# Patient Record
Sex: MALE | Race: WHITE | NOT HISPANIC OR LATINO | URBAN - METROPOLITAN AREA
[De-identification: names, ages, dates, MRNs, and addresses within clinical notes are randomized per-mention and may not be internally consistent; named-entity substitution may affect disease eponyms.]

---

## 2020-03-13 NOTE — PATIENT DISCUSSION
"""H/o OHT OU. IOP 25 OU. OCT(RNFL) done today within normal quadrants.  Schedule [facility] 24-""

## 2020-12-23 NOTE — PATIENT DISCUSSION
"""S/P IOL OS: Sensar AAB00 23 +Omidria. Continue post operative instructions and drops per schedule.  """

## 2020-12-30 NOTE — PATIENT DISCUSSION
"""IOP 32/29 today in the office. Stop Travatan Z left eye at bedtime.  Start Cosopt PF both eyes ""

## 2021-01-06 NOTE — PATIENT DISCUSSION
"""S/P IOL OD: Sensar AAB00 22 +Omidria. Continue post operative instructions and drops per schedule.  """

## 2021-01-15 NOTE — PATIENT DISCUSSION
"""S/P IOL OD: Sensar AAB00 22 +Omidria.  Continue post operative instructions and drops per schedule. "" ""Continue Cosopt PF both eyes twice a day

## 2021-02-18 NOTE — PATIENT DISCUSSION
"""OD improving. Discussed today's findings with patient.  Will slowly taper patient off of steroid to ""

## 2021-03-25 NOTE — PATIENT DISCUSSION
"""Educated patient on today's findings. Resolved iritis s/p CE OD.  Patient denies light sensitivity or ""

## 2021-06-29 ENCOUNTER — NEW PATIENT COMPREHENSIVE (OUTPATIENT)
Dept: URBAN - METROPOLITAN AREA CLINIC 50 | Facility: CLINIC | Age: 66
End: 2021-06-29

## 2021-06-29 DIAGNOSIS — H25.11: ICD-10-CM

## 2021-06-29 DIAGNOSIS — H35.373: ICD-10-CM

## 2021-06-29 PROCEDURE — 99204 OFFICE O/P NEW MOD 45 MIN: CPT

## 2021-06-29 PROCEDURE — 92134 CPTRZ OPH DX IMG PST SGM RTA: CPT

## 2021-06-29 ASSESSMENT — VISUAL ACUITY
OS_CC: 20/40
OD_PH: 20/50
OS_PH: 20/30
OS_GLARE: 20/50
OD_CC: 20/400
OS_GLARE: 20/50
OD_GLARE: 20/400
OD_GLARE: 20/100

## 2021-06-29 ASSESSMENT — KERATOMETRY
OD_K2POWER_DIOPTERS: 45.50
OD_AXISANGLE_DEGREES: 171
OS_AXISANGLE_DEGREES: 072
OD_K1POWER_DIOPTERS: 45.75
OD_AXISANGLE2_DEGREES: 81
OS_K2POWER_DIOPTERS: 45.00
OS_K1POWER_DIOPTERS: 48.25
OS_AXISANGLE2_DEGREES: 162

## 2021-06-29 ASSESSMENT — TONOMETRY
OS_IOP_MMHG: 15
OD_IOP_MMHG: 15

## 2021-07-28 ENCOUNTER — BIOMETRY (OUTPATIENT)
Dept: URBAN - METROPOLITAN AREA CLINIC 52 | Facility: CLINIC | Age: 66
End: 2021-07-28

## 2021-07-28 DIAGNOSIS — H25.11: ICD-10-CM

## 2021-07-28 PROCEDURE — 92136 OPHTHALMIC BIOMETRY: CPT

## 2021-07-28 PROCEDURE — TOPOIOL CORNEAL TOPOGRAPHY-PREMIUM IOL

## 2021-07-28 ASSESSMENT — KERATOMETRY
OD_K2POWER_DIOPTERS: 44.62
OD_AXISANGLE_DEGREES: 29
OD_AXISANGLE2_DEGREES: 119
OD_K1POWER_DIOPTERS: 45.37

## 2021-08-12 ENCOUNTER — PRE OP - CE/IOL OD (OUTPATIENT)
Dept: URBAN - METROPOLITAN AREA CLINIC 52 | Facility: CLINIC | Age: 66
End: 2021-08-12

## 2021-08-12 DIAGNOSIS — H25.11: ICD-10-CM

## 2021-08-12 DIAGNOSIS — H35.373: ICD-10-CM

## 2021-08-12 PROCEDURE — PREOP PRE OP VISIT

## 2021-08-12 PROCEDURE — 92134 CPTRZ OPH DX IMG PST SGM RTA: CPT

## 2021-08-12 ASSESSMENT — VISUAL ACUITY
OD_SC: CF 4FT
OD_PH: 20/100
OS_SC: 20/25-1

## 2021-08-12 ASSESSMENT — KERATOMETRY
OD_K1POWER_DIOPTERS: 45.37
OD_AXISANGLE2_DEGREES: 119
OD_K2POWER_DIOPTERS: 44.62
OD_AXISANGLE_DEGREES: 29

## 2021-08-12 ASSESSMENT — TONOMETRY
OS_IOP_MMHG: 12
OD_IOP_MMHG: 13

## 2021-08-18 ENCOUNTER — SAME DAY PO - CE/IOL (OUTPATIENT)
Dept: URBAN - METROPOLITAN AREA CLINIC 52 | Facility: CLINIC | Age: 66
End: 2021-08-18

## 2021-08-18 ENCOUNTER — SURGERY/PROCEDURE (OUTPATIENT)
Dept: URBAN - METROPOLITAN AREA SURGERY 16 | Facility: SURGERY | Age: 66
End: 2021-08-18

## 2021-08-18 DIAGNOSIS — Z98.41: ICD-10-CM

## 2021-08-18 DIAGNOSIS — H25.11: ICD-10-CM

## 2021-08-18 DIAGNOSIS — Z96.1: ICD-10-CM

## 2021-08-18 PROCEDURE — 66984 XCAPSL CTRC RMVL W/O ECP: CPT

## 2021-08-18 PROCEDURE — 99024 POSTOP FOLLOW-UP VISIT: CPT

## 2021-08-18 PROCEDURE — DIB00FEMTO EYHANCE MONOFOCAL IOL WITH FEMTO

## 2021-08-18 ASSESSMENT — KERATOMETRY
OD_K2POWER_DIOPTERS: 44.62
OD_AXISANGLE2_DEGREES: 119
OD_AXISANGLE_DEGREES: 29
OD_K1POWER_DIOPTERS: 45.37
OD_K2POWER_DIOPTERS: 44.62
OD_AXISANGLE2_DEGREES: 119
OD_K1POWER_DIOPTERS: 45.37
OD_AXISANGLE_DEGREES: 29

## 2021-08-18 ASSESSMENT — TONOMETRY: OD_IOP_MMHG: 18

## 2021-08-18 ASSESSMENT — VISUAL ACUITY: OD_SC: 20/40

## 2021-08-26 ENCOUNTER — 1 WEEK PO - CE/IOL (OUTPATIENT)
Dept: URBAN - METROPOLITAN AREA CLINIC 52 | Facility: CLINIC | Age: 66
End: 2021-08-26

## 2021-08-26 DIAGNOSIS — Z98.41: ICD-10-CM

## 2021-08-26 PROCEDURE — 99024 POSTOP FOLLOW-UP VISIT: CPT

## 2021-08-26 ASSESSMENT — VISUAL ACUITY
OS_SC: 20/30
OD_SC: 20/20-1

## 2021-08-26 ASSESSMENT — TONOMETRY
OS_IOP_MMHG: 12
OD_IOP_MMHG: 11

## 2021-08-28 NOTE — PATIENT DISCUSSION
CATARACT SURGERY PLANNER - STANDARD IOL/+FEMTO: Phacoemulsification with IOL: Eye: right|DOS: 8/18/21|Model: [id number]|Power: 17 (ORA)|Femto: yes|Arcs: 36 @ 15 ; 36 @ 195|Visc: duet|Omidria: yes|10% Phenylephrine: no|Epi-shugarcaine: yes|Phaco Setting: dense|BSS+: no|Trypan Blue: no|CTR: no|Olive Tip: no|Atropine: no|Pupilloplasty: no|Notes: ERM.
Instructed to call immediately if any new distortion, blurring, decreased vision or eye pain.
LENS OPTION (CLASSIC): Discussed with patient in detail all available methods and lens options as well as their associated benefits, limitations and out-of-pocket costs. Patient chooses femtosecond laser-assisted cataract surgery with monofocal lens implant and understands that reading glasses will still be needed after surgery. The patient also understands that with any IOL there is no guarantee that they will not require glasses to see their best at any distance after surgery. The risks, benefits and alternatives to surgery were explained and all questions were answered.
Recommended observation.
Pt c/o right foot pain x2 weeks, worsening overnight & waking him from sleep; sent in from urgent care for a weak pulse. Pt reports he has sensation, but feels tingling.

## 2021-09-22 ENCOUNTER — 4 WEEK PO - CE/IOL (OUTPATIENT)
Dept: URBAN - METROPOLITAN AREA CLINIC 52 | Facility: CLINIC | Age: 66
End: 2021-09-22

## 2021-09-22 DIAGNOSIS — H43.811: ICD-10-CM

## 2021-09-22 DIAGNOSIS — H35.373: ICD-10-CM

## 2021-09-22 DIAGNOSIS — Z98.41: ICD-10-CM

## 2021-09-22 PROCEDURE — 99024 POSTOP FOLLOW-UP VISIT: CPT

## 2021-09-22 ASSESSMENT — VISUAL ACUITY
OU_SC: 20/30
OS_SC: 20/20
OD_SC: J7
OD_SC: 20/20
OS_SC: J7
OS_SC: 20/30
OD_SC: 20/40
OU_SC: J4@ 16 IN
OU_SC: 20/20

## 2021-09-22 ASSESSMENT — TONOMETRY
OD_IOP_MMHG: 11
OS_IOP_MMHG: 13

## 2022-02-08 ENCOUNTER — COMPREHENSIVE EXAM (OUTPATIENT)
Dept: URBAN - METROPOLITAN AREA CLINIC 50 | Facility: CLINIC | Age: 67
End: 2022-02-08

## 2022-02-08 DIAGNOSIS — H43.811: ICD-10-CM

## 2022-02-08 DIAGNOSIS — H35.373: ICD-10-CM

## 2022-02-08 PROCEDURE — 92014 COMPRE OPH EXAM EST PT 1/>: CPT

## 2022-02-08 PROCEDURE — 92134 CPTRZ OPH DX IMG PST SGM RTA: CPT

## 2022-02-08 ASSESSMENT — VISUAL ACUITY
OD_GLARE: 20/50
OU_SC: 20/20-1
OD_GLARE: 20/25-2
OU_CC: J1+
OD_SC: 20/20-1
OS_SC: 20/20-1
OU_SC: J5
OU_SC: 20/25

## 2022-02-08 ASSESSMENT — TONOMETRY
OS_IOP_MMHG: 12
OD_IOP_MMHG: 12

## 2023-04-18 ENCOUNTER — COMPREHENSIVE EXAM (OUTPATIENT)
Dept: URBAN - METROPOLITAN AREA CLINIC 50 | Facility: CLINIC | Age: 68
End: 2023-04-18

## 2023-04-18 DIAGNOSIS — Z01.01: ICD-10-CM

## 2023-04-18 DIAGNOSIS — H52.4: ICD-10-CM

## 2023-04-18 DIAGNOSIS — H35.373: ICD-10-CM

## 2023-04-18 DIAGNOSIS — H43.811: ICD-10-CM

## 2023-04-18 PROCEDURE — 92134 CPTRZ OPH DX IMG PST SGM RTA: CPT

## 2023-04-18 PROCEDURE — 92014 COMPRE OPH EXAM EST PT 1/>: CPT

## 2023-04-18 PROCEDURE — 92015 DETERMINE REFRACTIVE STATE: CPT

## 2023-04-18 ASSESSMENT — VISUAL ACUITY
OU_SC: 20/20
OD_SC: 20/25
OS_SC: 20/20-1

## 2023-04-18 ASSESSMENT — TONOMETRY
OD_IOP_MMHG: 14
OS_IOP_MMHG: 14

## 2024-06-18 ENCOUNTER — ESTABLISHED PATIENT (OUTPATIENT)
Dept: URBAN - METROPOLITAN AREA CLINIC 50 | Facility: LOCATION | Age: 69
End: 2024-06-18

## 2024-06-18 DIAGNOSIS — H35.373: ICD-10-CM

## 2024-06-18 DIAGNOSIS — Z01.01: ICD-10-CM

## 2024-06-18 DIAGNOSIS — H43.811: ICD-10-CM

## 2024-06-18 PROCEDURE — 92014 COMPRE OPH EXAM EST PT 1/>: CPT

## 2024-06-18 PROCEDURE — 92015 DETERMINE REFRACTIVE STATE: CPT

## 2024-06-18 ASSESSMENT — VISUAL ACUITY
OS_SC: 20/25-1
OU_CC: J1+
OD_SC: 20/20

## 2024-06-18 ASSESSMENT — TONOMETRY
OS_IOP_MMHG: 16
OD_IOP_MMHG: 15

## 2025-06-24 ENCOUNTER — COMPREHENSIVE EXAM (OUTPATIENT)
Age: 70
End: 2025-06-24

## 2025-06-24 DIAGNOSIS — Z96.1: ICD-10-CM

## 2025-06-24 DIAGNOSIS — H02.831: ICD-10-CM

## 2025-06-24 DIAGNOSIS — H43.811: ICD-10-CM

## 2025-06-24 DIAGNOSIS — H02.834: ICD-10-CM

## 2025-06-24 DIAGNOSIS — H35.373: ICD-10-CM

## 2025-06-24 DIAGNOSIS — H52.4: ICD-10-CM

## 2025-06-24 PROCEDURE — 92134 CPTRZ OPH DX IMG PST SGM RTA: CPT

## 2025-06-24 PROCEDURE — 99214 OFFICE O/P EST MOD 30 MIN: CPT
